# Patient Record
Sex: MALE | Race: BLACK OR AFRICAN AMERICAN | NOT HISPANIC OR LATINO | URBAN - METROPOLITAN AREA
[De-identification: names, ages, dates, MRNs, and addresses within clinical notes are randomized per-mention and may not be internally consistent; named-entity substitution may affect disease eponyms.]

---

## 2017-05-28 ENCOUNTER — EMERGENCY (EMERGENCY)
Facility: HOSPITAL | Age: 52
LOS: 1 days | Discharge: HOME | End: 2017-05-28
Attending: EMERGENCY MEDICINE | Admitting: EMERGENCY MEDICINE
Payer: COMMERCIAL

## 2017-05-28 VITALS
RESPIRATION RATE: 20 BRPM | DIASTOLIC BLOOD PRESSURE: 81 MMHG | HEART RATE: 82 BPM | TEMPERATURE: 98 F | HEIGHT: 74 IN | OXYGEN SATURATION: 97 % | SYSTOLIC BLOOD PRESSURE: 124 MMHG

## 2017-05-28 VITALS
TEMPERATURE: 98 F | SYSTOLIC BLOOD PRESSURE: 116 MMHG | HEART RATE: 74 BPM | DIASTOLIC BLOOD PRESSURE: 66 MMHG | RESPIRATION RATE: 18 BRPM | OXYGEN SATURATION: 99 %

## 2017-05-28 DIAGNOSIS — R06.02 SHORTNESS OF BREATH: ICD-10-CM

## 2017-05-28 LAB
APTT BLD: 34.1 SEC — SIGNIFICANT CHANGE UP (ref 27.5–37.4)
BASOPHILS # BLD AUTO: 0 K/UL — SIGNIFICANT CHANGE UP (ref 0–0.2)
BASOPHILS NFR BLD AUTO: 0.5 % — SIGNIFICANT CHANGE UP (ref 0–2)
CK MB BLD-MCNC: 0.6 % — SIGNIFICANT CHANGE UP (ref 0–3.5)
CK MB CFR SERPL CALC: 2 NG/ML — SIGNIFICANT CHANGE UP (ref 0–6.7)
CK SERPL-CCNC: 357 U/L — HIGH (ref 30–200)
EOSINOPHIL # BLD AUTO: 0.2 K/UL — SIGNIFICANT CHANGE UP (ref 0–0.5)
EOSINOPHIL NFR BLD AUTO: 2.6 % — SIGNIFICANT CHANGE UP (ref 0–6)
HCT VFR BLD CALC: 39.6 % — SIGNIFICANT CHANGE UP (ref 39–50)
HGB BLD-MCNC: 13.2 G/DL — SIGNIFICANT CHANGE UP (ref 13–17)
INR BLD: 1.21 RATIO — HIGH (ref 0.88–1.16)
LYMPHOCYTES # BLD AUTO: 2 K/UL — SIGNIFICANT CHANGE UP (ref 1–3.3)
LYMPHOCYTES # BLD AUTO: 30.4 % — SIGNIFICANT CHANGE UP (ref 13–44)
MCHC RBC-ENTMCNC: 31.4 PG — SIGNIFICANT CHANGE UP (ref 27–34)
MCHC RBC-ENTMCNC: 33.2 GM/DL — SIGNIFICANT CHANGE UP (ref 32–36)
MCV RBC AUTO: 94.3 FL — SIGNIFICANT CHANGE UP (ref 80–100)
MONOCYTES # BLD AUTO: 0.6 K/UL — SIGNIFICANT CHANGE UP (ref 0–0.9)
MONOCYTES NFR BLD AUTO: 8.5 % — SIGNIFICANT CHANGE UP (ref 2–14)
NEUTROPHILS # BLD AUTO: 3.9 K/UL — SIGNIFICANT CHANGE UP (ref 1.8–7.4)
NEUTROPHILS NFR BLD AUTO: 58 % — SIGNIFICANT CHANGE UP (ref 43–77)
PLATELET # BLD AUTO: 261 K/UL — SIGNIFICANT CHANGE UP (ref 150–400)
PROTHROM AB SERPL-ACNC: 13.2 SEC — HIGH (ref 9.8–12.7)
RAPID RVP RESULT: SIGNIFICANT CHANGE UP
RBC # BLD: 4.2 M/UL — SIGNIFICANT CHANGE UP (ref 4.2–5.8)
RBC # FLD: 12.4 % — SIGNIFICANT CHANGE UP (ref 10.3–14.5)
TROPONIN T SERPL-MCNC: <0.01 NG/ML — SIGNIFICANT CHANGE UP (ref 0–0.06)
WBC # BLD: 6.7 K/UL — SIGNIFICANT CHANGE UP (ref 3.8–10.5)
WBC # FLD AUTO: 6.7 K/UL — SIGNIFICANT CHANGE UP (ref 3.8–10.5)

## 2017-05-28 PROCEDURE — 82550 ASSAY OF CK (CPK): CPT

## 2017-05-28 PROCEDURE — 84484 ASSAY OF TROPONIN QUANT: CPT

## 2017-05-28 PROCEDURE — 85730 THROMBOPLASTIN TIME PARTIAL: CPT

## 2017-05-28 PROCEDURE — 71046 X-RAY EXAM CHEST 2 VIEWS: CPT

## 2017-05-28 PROCEDURE — 93010 ELECTROCARDIOGRAM REPORT: CPT

## 2017-05-28 PROCEDURE — 93005 ELECTROCARDIOGRAM TRACING: CPT

## 2017-05-28 PROCEDURE — 87581 M.PNEUMON DNA AMP PROBE: CPT

## 2017-05-28 PROCEDURE — 71020: CPT | Mod: 26

## 2017-05-28 PROCEDURE — 99285 EMERGENCY DEPT VISIT HI MDM: CPT | Mod: 25

## 2017-05-28 PROCEDURE — 87633 RESP VIRUS 12-25 TARGETS: CPT

## 2017-05-28 PROCEDURE — 82553 CREATINE MB FRACTION: CPT

## 2017-05-28 PROCEDURE — 85610 PROTHROMBIN TIME: CPT

## 2017-05-28 PROCEDURE — 83880 ASSAY OF NATRIURETIC PEPTIDE: CPT

## 2017-05-28 PROCEDURE — 99283 EMERGENCY DEPT VISIT LOW MDM: CPT | Mod: 25

## 2017-05-28 PROCEDURE — 87486 CHLMYD PNEUM DNA AMP PROBE: CPT

## 2017-05-28 PROCEDURE — 85027 COMPLETE CBC AUTOMATED: CPT

## 2017-05-28 PROCEDURE — 87798 DETECT AGENT NOS DNA AMP: CPT

## 2017-05-28 PROCEDURE — 80053 COMPREHEN METABOLIC PANEL: CPT

## 2017-05-28 RX ORDER — AMLODIPINE BESYLATE AND OLMESARTRAN MEDOXOMIL 10; 40 MG/1; MG/1
0 TABLET, FILM COATED ORAL
Qty: 0 | Refills: 0 | COMMUNITY

## 2017-05-28 NOTE — ED ADULT TRIAGE NOTE - CHIEF COMPLAINT QUOTE
difficulty breathing, "throat closing especially at night" (denies chest pain/nausea/vomiting/long distance travel, +nonsmoker), +dry cough x 2 wks

## 2017-05-28 NOTE — ED PROVIDER NOTE - OBJECTIVE STATEMENT
51 M htn and prediabetes p/w dyspnea x 2 days and dry cough x 2wks. per pt, has been having non-productive cough, nasal congestion. SOB at night and "feels like throat is closing up on him". Also, c/o decreased exercise tolerance. No fever, chills, n/v/d. No chest pain, palpitations. No recent travels. Per pt, has been retaining water recently and he was started on HCTZ.

## 2017-05-28 NOTE — ED ADULT NURSE NOTE - OBJECTIVE STATEMENT
52 yo M presents to ED A+Ox3 c/o difficulty breathing at night for the past two nights. Pt. reports "feeling like my throat is closing" at night. Reports improvement after sitting up. Pt. sitting in stretcher comfortably, breathing unlabored on RA, lung sounds clear. Skin warm pink and dry. Abdomen soft. Afebrile at this time. Pt. reports nasal congestion and dry cough x2 nights. Denies abdominal pain, diarrhea, changes in urinary pattern, chest pain, palpitations, fever, chills, cough, sore throat. 52 yo M presents to ED A+Ox3 c/o difficulty breathing at night for the past two nights. Pt. reports "feeling like my throat is closing" at night. Reports improvement after sitting up. Pt. sitting in stretcher comfortably, breathing unlabored on RA, lung sounds clear. Skin warm pink and dry. Abdomen soft. Afebrile at this time. Pt. reports nasal congestion and dry cough x2 nights. Denies abdominal pain, diarrhea, changes in urinary pattern, chest pain, palpitations, fever, chills, cough, sore throat. Pt. denies SOB at this time. Comfort and safety measures in place. MD at bedside evaluating patient.

## 2017-05-28 NOTE — ED PROVIDER NOTE - ATTENDING CONTRIBUTION TO CARE
Patient with shortness of breath and nasal congestion x 2 days. Moderate. Not better with rest. Non-productive cough. Patient states mild throat closing at night time and decreased exercise tolerance. No chest pain. No fever/chills. No leg swelling.    patient's BMI places him in the morbidly obese category, ncat, trachea midline, cooperative, shortened neck, ctab, non-tachycardic, non-tachypneic, pleasant, no edema, soft, no rebound/guarding  will get iv, labs, CE, and pro-BNP and reassess  pro-BNP within normal limits, CE negative, pending negative rvp and patient to  follow up with sleep study in 3 days.

## 2017-05-28 NOTE — ED PROVIDER NOTE - PROGRESS NOTE DETAILS
Faith Goss M.D: pt signed out to me pending rvp. RVP is negative, labs wnl. pt has follow up for wednesday to get sleep study. pt okay with going home. Yuri Tabares MD: No immediate life threatening issues present on history or clinical exam. Patient is a safe disposition home, has capacity and insight into their condition, ambulatory in the emergency department and will follow up with their doctor(s) this week. Patient and family  understand anticipatory guidance were given strict return and follow up precautions.  The patient and family have been informed of all concerning signs and symptoms to return to Emergency Department, the necessity to follow up with PMD/Clinic/follow up provided within 2-3 days was explained, and the patient and/or family reports understanding of above with capacity and insight.

## 2017-05-28 NOTE — ED ADULT NURSE REASSESSMENT NOTE - NS ED NURSE REASSESS COMMENT FT1
Labs sent as ordered. Breathing unlabored on RA. Pt. denies SOB, chest pain. Pt. appears comfortable. Comfort and safety measures in place.

## 2024-02-09 ENCOUNTER — EMERGENCY (EMERGENCY)
Facility: HOSPITAL | Age: 59
LOS: 1 days | Discharge: HOME | End: 2024-02-09
Attending: STUDENT IN AN ORGANIZED HEALTH CARE EDUCATION/TRAINING PROGRAM
Payer: COMMERCIAL

## 2024-02-09 VITALS
RESPIRATION RATE: 18 BRPM | HEIGHT: 74 IN | DIASTOLIC BLOOD PRESSURE: 109 MMHG | TEMPERATURE: 99 F | HEART RATE: 95 BPM | SYSTOLIC BLOOD PRESSURE: 200 MMHG | OXYGEN SATURATION: 98 %

## 2024-02-09 VITALS
OXYGEN SATURATION: 95 % | TEMPERATURE: 99 F | DIASTOLIC BLOOD PRESSURE: 101 MMHG | SYSTOLIC BLOOD PRESSURE: 166 MMHG | RESPIRATION RATE: 16 BRPM | HEART RATE: 76 BPM

## 2024-02-09 PROBLEM — R73.03 PREDIABETES: Chronic | Status: ACTIVE | Noted: 2017-05-28

## 2024-02-09 PROBLEM — G47.30 SLEEP APNEA, UNSPECIFIED: Chronic | Status: ACTIVE | Noted: 2017-05-28

## 2024-02-09 PROBLEM — I10 ESSENTIAL (PRIMARY) HYPERTENSION: Chronic | Status: ACTIVE | Noted: 2017-05-28

## 2024-02-09 LAB
ALBUMIN SERPL ELPH-MCNC: 4.2 G/DL — SIGNIFICANT CHANGE UP (ref 3.3–5)
ALP SERPL-CCNC: 67 U/L — SIGNIFICANT CHANGE UP (ref 40–120)
ALT FLD-CCNC: 17 U/L — SIGNIFICANT CHANGE UP (ref 10–45)
ANION GAP SERPL CALC-SCNC: 12 MMOL/L — SIGNIFICANT CHANGE UP (ref 5–17)
AST SERPL-CCNC: 17 U/L — SIGNIFICANT CHANGE UP (ref 10–40)
BASOPHILS # BLD AUTO: 0.04 K/UL — SIGNIFICANT CHANGE UP (ref 0–0.2)
BASOPHILS NFR BLD AUTO: 0.6 % — SIGNIFICANT CHANGE UP (ref 0–2)
BILIRUB SERPL-MCNC: 0.2 MG/DL — SIGNIFICANT CHANGE UP (ref 0.2–1.2)
BUN SERPL-MCNC: 10 MG/DL — SIGNIFICANT CHANGE UP (ref 7–23)
CALCIUM SERPL-MCNC: 9.5 MG/DL — SIGNIFICANT CHANGE UP (ref 8.4–10.5)
CHLORIDE SERPL-SCNC: 101 MMOL/L — SIGNIFICANT CHANGE UP (ref 96–108)
CO2 SERPL-SCNC: 30 MMOL/L — SIGNIFICANT CHANGE UP (ref 22–31)
CREAT SERPL-MCNC: 1.08 MG/DL — SIGNIFICANT CHANGE UP (ref 0.5–1.3)
EGFR: 79 ML/MIN/1.73M2 — SIGNIFICANT CHANGE UP
EOSINOPHIL # BLD AUTO: 0.24 K/UL — SIGNIFICANT CHANGE UP (ref 0–0.5)
EOSINOPHIL NFR BLD AUTO: 3.6 % — SIGNIFICANT CHANGE UP (ref 0–6)
GLUCOSE SERPL-MCNC: 83 MG/DL — SIGNIFICANT CHANGE UP (ref 70–99)
HCT VFR BLD CALC: 42.9 % — SIGNIFICANT CHANGE UP (ref 39–50)
HGB BLD-MCNC: 13.3 G/DL — SIGNIFICANT CHANGE UP (ref 13–17)
IMM GRANULOCYTES NFR BLD AUTO: 0.2 % — SIGNIFICANT CHANGE UP (ref 0–0.9)
LYMPHOCYTES # BLD AUTO: 1.81 K/UL — SIGNIFICANT CHANGE UP (ref 1–3.3)
LYMPHOCYTES # BLD AUTO: 27.4 % — SIGNIFICANT CHANGE UP (ref 13–44)
MAGNESIUM SERPL-MCNC: 2.1 MG/DL — SIGNIFICANT CHANGE UP (ref 1.6–2.6)
MCHC RBC-ENTMCNC: 28.5 PG — SIGNIFICANT CHANGE UP (ref 27–34)
MCHC RBC-ENTMCNC: 31 GM/DL — LOW (ref 32–36)
MCV RBC AUTO: 92.1 FL — SIGNIFICANT CHANGE UP (ref 80–100)
MONOCYTES # BLD AUTO: 0.65 K/UL — SIGNIFICANT CHANGE UP (ref 0–0.9)
MONOCYTES NFR BLD AUTO: 9.8 % — SIGNIFICANT CHANGE UP (ref 2–14)
NEUTROPHILS # BLD AUTO: 3.85 K/UL — SIGNIFICANT CHANGE UP (ref 1.8–7.4)
NEUTROPHILS NFR BLD AUTO: 58.4 % — SIGNIFICANT CHANGE UP (ref 43–77)
NRBC # BLD: 0 /100 WBCS — SIGNIFICANT CHANGE UP (ref 0–0)
NT-PROBNP SERPL-SCNC: 47 PG/ML — SIGNIFICANT CHANGE UP (ref 0–300)
PLATELET # BLD AUTO: 309 K/UL — SIGNIFICANT CHANGE UP (ref 150–400)
POTASSIUM SERPL-MCNC: 4.1 MMOL/L — SIGNIFICANT CHANGE UP (ref 3.5–5.3)
POTASSIUM SERPL-SCNC: 4.1 MMOL/L — SIGNIFICANT CHANGE UP (ref 3.5–5.3)
PROT SERPL-MCNC: 7.9 G/DL — SIGNIFICANT CHANGE UP (ref 6–8.3)
RBC # BLD: 4.66 M/UL — SIGNIFICANT CHANGE UP (ref 4.2–5.8)
RBC # FLD: 13.2 % — SIGNIFICANT CHANGE UP (ref 10.3–14.5)
SODIUM SERPL-SCNC: 143 MMOL/L — SIGNIFICANT CHANGE UP (ref 135–145)
TROPONIN T, HIGH SENSITIVITY RESULT: 7 NG/L — SIGNIFICANT CHANGE UP (ref 0–51)
WBC # BLD: 6.6 K/UL — SIGNIFICANT CHANGE UP (ref 3.8–10.5)
WBC # FLD AUTO: 6.6 K/UL — SIGNIFICANT CHANGE UP (ref 3.8–10.5)

## 2024-02-09 PROCEDURE — 83880 ASSAY OF NATRIURETIC PEPTIDE: CPT

## 2024-02-09 PROCEDURE — 93005 ELECTROCARDIOGRAM TRACING: CPT

## 2024-02-09 PROCEDURE — 83735 ASSAY OF MAGNESIUM: CPT

## 2024-02-09 PROCEDURE — 99285 EMERGENCY DEPT VISIT HI MDM: CPT

## 2024-02-09 PROCEDURE — 99285 EMERGENCY DEPT VISIT HI MDM: CPT | Mod: 25

## 2024-02-09 PROCEDURE — 85025 COMPLETE CBC W/AUTO DIFF WBC: CPT

## 2024-02-09 PROCEDURE — 80053 COMPREHEN METABOLIC PANEL: CPT

## 2024-02-09 PROCEDURE — 71045 X-RAY EXAM CHEST 1 VIEW: CPT

## 2024-02-09 PROCEDURE — 71045 X-RAY EXAM CHEST 1 VIEW: CPT | Mod: 26

## 2024-02-09 PROCEDURE — 84484 ASSAY OF TROPONIN QUANT: CPT

## 2024-02-09 NOTE — ED PROVIDER NOTE - ATTENDING CONTRIBUTION TO CARE
Attending Manish: I performed a history and physical exam of the patient and discussed their management with the resident/fellow/student. I have reviewed the resident/fellow/student note and agree with the documented findings and plan of care, except as noted. I have personally performed a substantive portion of the visit including all aspects of the medical decision making. My medical decision making and observations are found above. Please refer to any progress notes for updates on clinical course. My notes supersedes the above resident/fellow/student note in case of discrepancy

## 2024-02-09 NOTE — ED PROVIDER NOTE - PROGRESS NOTE DETAILS
Marysol, PGY3: Patient made aware of his work up results. Patient understands to follow up with cardiology for further management and work up for his shortness of breath. Patient found with stable VS for discharge. Will dc with strict return precautions, PCP & cardiology follow up.

## 2024-02-09 NOTE — ED PROVIDER NOTE - NSFOLLOWUPINSTRUCTIONS_ED_ALL_ED_FT
You were seen for shortness of breath. Please find your results attached to this document.     Continue all your home medications.     Please follow up with your primary care doctor for further management.     Please follow up with cardiology for further management such as echocardiogram.     Please return to the ED for any chest pain, syncope or any other concerns.

## 2024-02-09 NOTE — ED PROVIDER NOTE - OBJECTIVE STATEMENT
57 y/o M w/ PMH of HTN, SURAJ, ?CHF, DM presenting w/ edema. Seen w/ significant other. Reports for over a month has felt like he developed swelling in his legs and arms. Has been more short of breath during this time as well. worse w/ exertion such as prolonged walking or climbing stairs. Is supposed to be on diuretics but stopped approx 1 month ago because he felt like his back pain was kidney pain from the diuretic use. Has started taking it again for the past few days. Has not followed up w/ PCP or cardiologist for this yet. Denies fevers, chills, headache, dizziness, blurred vision, chest pain, cough, abdominal pain, n/v/d/c, urinary symptoms, MSK pain, rash.

## 2024-02-09 NOTE — ED PROVIDER NOTE - NSFOLLOWUPCLINICS_GEN_ALL_ED_FT
Cardiology at [facility]  Cardiology  [address] [place] [address]  Phone: [phone]  Fax:

## 2024-02-09 NOTE — ED ADULT NURSE NOTE - NSFALLUNIVINTERV_ED_ALL_ED
Bed/Stretcher in lowest position, wheels locked, appropriate side rails in place/Call bell, personal items and telephone in reach/Instruct patient to call for assistance before getting out of bed/chair/stretcher/Non-slip footwear applied when patient is off stretcher/[place] to call system/Physically safe environment - no spills, clutter or unnecessary equipment/Purposeful proactive rounding/Room/bathroom lighting operational, light cord in reach

## 2024-02-09 NOTE — ED PROVIDER NOTE - PATIENT PORTAL LINK FT
You can access the FollowMyHealth Patient Portal offered by [facility] by registering at the following website: [email or web address]. By joining [facility]’s FollowMyHealth portal, you will also be able to view your health information using other applications (apps) compatible with our system.

## 2024-02-09 NOTE — ED PROVIDER NOTE - RAPID ASSESSMENT
57 y/o with PMHX sleep apnea, HTN, "water retention", type II DM on no medications for 1 month because (hurts his kidneys), presents to the ED complaining of water retention all over the body but mainly in the legs. Feels that the swelling is all over the body. Testicles are not swollen. He is urinating normally. He stopped taking all of his medications 1 month ago because he did not like how they made his kidneys feel. He was previously on a "water pill". NKDA. No smoking/drinking daily. Never had surgery before. No chest pain. Feels short of breath when he walks up a flight of stairs or when he walks too far. Feels generalized weakness and fatigue. Has had these symptoms for over a month. Does not feel like himself. No abdominal pain, n/v.     Patient was rapidly assessed via a telemedicine and/or role of Quick Triage TAO [place]. A limited history, physical exam and assessment was performed. The patient will be seen and further evaluated in the main emergency department. The remainder of care and evaluation will be conducted by the primary emergency medicine team. Receiving team will follow up on labs, imaging and serially reassess patient as indicated. All further decisions regarding patient care, evaluation and disposition are at the discretion of the receiving primary emergency department team.

## 2024-02-09 NOTE — ED PROVIDER NOTE - CLINICAL SUMMARY MEDICAL DECISION MAKING FREE TEXT BOX
Attending Manish: 59 y/o M w/ PMH of HTN, SURAJ, ?CHF, DM presenting w/ edema. Seen w/ significant other. Reports for over a month has felt like he developed swelling in his legs and arms. Has been more short of breath during this time as well. worse w/ exertion such as prolonged walking or climbing stairs. Is supposed to be on diuretics but stopped approx 1 month ago because he felt like his back pain was kidney pain from the diuretic use. Has started taking it again for the past few days. Has not followed up w/ PCP or cardiologist for this yet. Denies fevers, chills, headache, dizziness, blurred vision, chest pain, cough, abdominal pain, n/v/d/c, urinary symptoms, MSK pain, rash. Pt overall no acute distress. PE as above. Will eval for CHF. Eval for kidney dysfunction. Do not suspect ACS. Do not suggest cellulitis or DVT. Plan for labs, imaging, ekg, meds PRN. Will reassess the need for additional interventions as clinically warranted. Refer to any progress notes for updates on clinical course and as a continuation of this MDM.

## 2024-02-09 NOTE — ED ADULT NURSE NOTE - OBJECTIVE STATEMENT
Pt with  with PMHX sleep apnea, HTN, "water retention", type II DM on no medications for 1 month because (hurts his kidneys), came to the ED complaining of generalized body edema. but mainly in the legs. Feels that the swelling is all over the body. Testicles are not swollen. He is urinating normally. No distress. Breathing easy and non labored. Pt is ambulatory. Pt is calm and cooperative.

## 2024-02-09 NOTE — ED PROVIDER NOTE - PHYSICAL EXAMINATION
Gen: NAD, AOx3, able to make needs known, non-toxic  Head: NCAT  HEENT: EOMI, oral mucosa moist, normal conjunctiva  Lung: no respiratory distress, CTAB, no wheezes/rhonchi/rales B/L, speaking in full sentences  CV: RRR, no murmurs  Abd: non distended, soft, nontender, no guarding, no CVA tenderness  MSK: no visible deformities. edematous BLE and BUE  Neuro: Appears non focal  Skin: Warm, well perfused  Psych: normal affect

## 2024-02-12 NOTE — EEG REPORT - NS EEG TEXT BOX
REPORT OF CONTINUOUS VIDEO EEG      [facility]: [address], [place] [address], Phone: [phone]  [facility]: [id number] [facility], [place] [address], Phone: [phone]  [facility]: [address] [place] [address], Phone: [phone]    Patient Name: Bucky Clarke    Age: 64 year, : 1959  MRN #: -, Cochran: --  Referring Physician: -  EEG #: [id number]    Study Date: 2024   Start Time: 13:35   End Date:  2024        End Time: 08:00    Study Duration: 18 h 3 m    Study Information:    EEG Recording Technique:  The patient underwent continuous Video-EEG monitoring, using Telemetry System hardware on the XLTek Digital System. EEG and video data were stored on a computer hard drive with important events saved in digital archive files. The material was reviewed by a physician (electroencephalographer / epileptologist) on a daily basis. Lei and seizure detection algorithms were utilized and reviewed. An EEG Technician attended to the patient, and was available throughout daytime work hours.  The epilepsy center neurologist was available in person or on call 24-hours per day.    EEG Placement and Labeling of Electrodes:  The EEG was performed utilizing 20 channel referential EEG connections (coronal over temporal over parasagittal montage) using all standard 10-20 electrode placements with EKG, with additional electrodes placed in the inferior temporal region using the modified 10-10 montage electrode placements for elective admissions, or if deemed necessary. Recording was at a sampling rate of 256 samples per second per channel. Time synchronized digital video recording was done simultaneously with EEG recording. A low light infrared camera was used for low light recording.     History:  -64 year-old male with memory loss is here to rule out  seizures      Medication  Flomax    Tylenol    Lovenox      Interpretation:    [[[Abbreviation Key:  PDR=alpha rhythm/posterior dominant rhythm. A-P=anterior posterior.  Amplitude: ‘very low’:<20; ‘low’:20-49; ‘medium’:; ‘high’:>150uV.  Persistence for periodic/rhythmic patterns (% of epoch) ‘rare’:<1%; ‘occasional’:1-10%; ‘frequent’:10-50%; ‘abundant’:50-90%; ‘continuous’:>90%.  Persistence for sporadic discharges: ‘rare’:<1/hr; ‘occasional’:1/min-1/hr; ‘frequent’:>1/min; ‘abundant’:>1/10 sec.  RPP=rhythmic and periodic patterns; GRDA=generalized rhythmic delta activity; FIRDA=frontal intermittent GRDA; LRDA=lateralized rhythmic delta activity; TIRDA=temporal intermittent rhythmic delta activity;  LPD=PLED=lateralized periodic discharges; GPD=generalized periodic discharges; BIPDs =bilateral independent periodic discharges; Mf=multifocal; SIRPDs=stimulus induced rhythmic, periodic, or ictal appearing discharges; BIRDs=brief potentially ictal rhythmic discharges >4 Hz, lasting .5-10s; PFA (paroxysmal bursts >13 Hz or =8 Hz <10s).  Modifiers: +F=with fast component; +S=with spike component; +R=with rhythmic component.  S-B=burst suppression pattern.  Max=maximal. N1-drowsy; N2-stage II sleep; N3-slow wave sleep. SSS/BETS=small sharp spikes/benign epileptiform transients of sleep. HV=hyperventilation; PS=photic stimulation]]]      Daily EEG Visual Analysis    FINDINGS:      Background:  Symmetry: symmetric  Continuous: continuous  PDR: symmetric, well-modulated 9 Hz activity, with amplitude to 40 uV, that attenuated to eye opening.  Low amplitude frontal beta noted in wakefulness.  Reactivity: present  Voltage: normal, [defined typically between 20-150uV]  Anterior Posterior Gradient: present  Breach: absent    Background Slowing:  Generalized slowing: none was present.  Focal slowing: none was present.    State Changes:   -Drowsiness was characterized by fragmentation, attenuation, and slowing of the background activity.    -N2 sleep transients with symmetric spindles and K-complexes.    Sporadic Epileptiform Discharges:   None    Rhythmic and Periodic Patterns (RPPs):  None     Electrographic and Electroclinical seizures:  None    Other Clinical Events:  None    Activation Procedures:   -Hyperventilation was not performed.    -Photic stimulation was not performed.    Artifacts:  Intermittent myogenic and movement artifacts were noted.    ECG:  The heart rate on single channel ECG was predominantly between 60-70 BPM.    EEG Summary / Classification:  Normal  EEG in the awake / drowsy / asleep states.  •	    EEG Impression / Clinical Correlate:  Normal prolonged EEG study.  No epileptic discharges recorded.  No seizures recorded.  •	    ________________________________________    MARYJO Zamora  Attending Physician, [facility] [place]      ------------------------------------  EEG Reading Room: [phone]  On Call Service After Hours: [phone]

## 2024-07-19 ENCOUNTER — INPATIENT (INPATIENT)
Facility: HOSPITAL | Age: 59
LOS: 7 days | Discharge: HOME | DRG: 948 | End: 2024-07-27
Attending: STUDENT IN AN ORGANIZED HEALTH CARE EDUCATION/TRAINING PROGRAM | Admitting: STUDENT IN AN ORGANIZED HEALTH CARE EDUCATION/TRAINING PROGRAM
Payer: COMMERCIAL

## 2024-07-19 VITALS
HEART RATE: 92 BPM | WEIGHT: 315 LBS | SYSTOLIC BLOOD PRESSURE: 203 MMHG | OXYGEN SATURATION: 94 % | TEMPERATURE: 98 F | HEIGHT: 74 IN | DIASTOLIC BLOOD PRESSURE: 103 MMHG | RESPIRATION RATE: 24 BRPM

## 2024-07-19 DIAGNOSIS — R60.1 GENERALIZED EDEMA: ICD-10-CM

## 2024-07-19 DIAGNOSIS — I10 ESSENTIAL (PRIMARY) HYPERTENSION: ICD-10-CM

## 2024-07-19 DIAGNOSIS — E11.9 TYPE 2 DIABETES MELLITUS WITHOUT COMPLICATIONS: ICD-10-CM

## 2024-07-19 DIAGNOSIS — Z29.9 ENCOUNTER FOR PROPHYLACTIC MEASURES, UNSPECIFIED: ICD-10-CM

## 2024-07-19 LAB
ALBUMIN SERPL ELPH-MCNC: 3.8 G/DL — SIGNIFICANT CHANGE UP (ref 3.3–5)
ALP SERPL-CCNC: 60 U/L — SIGNIFICANT CHANGE UP (ref 40–120)
ALT FLD-CCNC: 19 U/L — SIGNIFICANT CHANGE UP (ref 10–45)
ANION GAP SERPL CALC-SCNC: 12 MMOL/L — SIGNIFICANT CHANGE UP (ref 5–17)
APPEARANCE UR: CLEAR — SIGNIFICANT CHANGE UP
AST SERPL-CCNC: 14 U/L — SIGNIFICANT CHANGE UP (ref 10–40)
BASOPHILS # BLD AUTO: 0.04 K/UL — SIGNIFICANT CHANGE UP (ref 0–0.2)
BASOPHILS NFR BLD AUTO: 0.5 % — SIGNIFICANT CHANGE UP (ref 0–2)
BILIRUB SERPL-MCNC: 0.4 MG/DL — SIGNIFICANT CHANGE UP (ref 0.2–1.2)
BILIRUB UR-MCNC: NEGATIVE — SIGNIFICANT CHANGE UP
BUN SERPL-MCNC: 13 MG/DL — SIGNIFICANT CHANGE UP (ref 7–23)
CALCIUM SERPL-MCNC: 9.2 MG/DL — SIGNIFICANT CHANGE UP (ref 8.4–10.5)
CHLORIDE SERPL-SCNC: 101 MMOL/L — SIGNIFICANT CHANGE UP (ref 96–108)
CO2 SERPL-SCNC: 30 MMOL/L — SIGNIFICANT CHANGE UP (ref 22–31)
COLOR SPEC: YELLOW — SIGNIFICANT CHANGE UP
CREAT SERPL-MCNC: 0.98 MG/DL — SIGNIFICANT CHANGE UP (ref 0.5–1.3)
DIFF PNL FLD: NEGATIVE — SIGNIFICANT CHANGE UP
EGFR: 89 ML/MIN/1.73M2 — SIGNIFICANT CHANGE UP
EGFR: 89 ML/MIN/1.73M2 — SIGNIFICANT CHANGE UP
EOSINOPHIL # BLD AUTO: 0.18 K/UL — SIGNIFICANT CHANGE UP (ref 0–0.5)
EOSINOPHIL NFR BLD AUTO: 2.4 % — SIGNIFICANT CHANGE UP (ref 0–6)
GLUCOSE BLDC GLUCOMTR-MCNC: 109 MG/DL — HIGH (ref 70–99)
GLUCOSE SERPL-MCNC: 93 MG/DL — SIGNIFICANT CHANGE UP (ref 70–99)
GLUCOSE UR QL: NEGATIVE MG/DL — SIGNIFICANT CHANGE UP
HCT VFR BLD CALC: 38.8 % — LOW (ref 39–50)
HGB BLD-MCNC: 11.2 G/DL — LOW (ref 13–17)
IMM GRANULOCYTES NFR BLD AUTO: 0.3 % — SIGNIFICANT CHANGE UP (ref 0–0.9)
KETONES UR-MCNC: NEGATIVE MG/DL — SIGNIFICANT CHANGE UP
LEUKOCYTE ESTERASE UR-ACNC: NEGATIVE — SIGNIFICANT CHANGE UP
LIDOCAIN IGE QN: 17 U/L — SIGNIFICANT CHANGE UP (ref 7–60)
LYMPHOCYTES # BLD AUTO: 1.14 K/UL — SIGNIFICANT CHANGE UP (ref 1–3.3)
LYMPHOCYTES # BLD AUTO: 15.2 % — SIGNIFICANT CHANGE UP (ref 13–44)
MCHC RBC-ENTMCNC: 25.7 PG — LOW (ref 27–34)
MCHC RBC-ENTMCNC: 28.9 GM/DL — LOW (ref 32–36)
MCV RBC AUTO: 89.2 FL — SIGNIFICANT CHANGE UP (ref 80–100)
MONOCYTES # BLD AUTO: 0.75 K/UL — SIGNIFICANT CHANGE UP (ref 0–0.9)
MONOCYTES NFR BLD AUTO: 10 % — SIGNIFICANT CHANGE UP (ref 2–14)
NEUTROPHILS # BLD AUTO: 5.37 K/UL — SIGNIFICANT CHANGE UP (ref 1.8–7.4)
NEUTROPHILS NFR BLD AUTO: 71.6 % — SIGNIFICANT CHANGE UP (ref 43–77)
NITRITE UR-MCNC: NEGATIVE — SIGNIFICANT CHANGE UP
NRBC # BLD: 0 /100 WBCS — SIGNIFICANT CHANGE UP (ref 0–0)
NRBC BLD-RTO: 0 /100 WBCS — SIGNIFICANT CHANGE UP (ref 0–0)
NT-PROBNP SERPL-SCNC: 125 PG/ML — SIGNIFICANT CHANGE UP (ref 0–300)
PH UR: 7.5 — SIGNIFICANT CHANGE UP (ref 5–8)
PLATELET # BLD AUTO: 308 K/UL — SIGNIFICANT CHANGE UP (ref 150–400)
POTASSIUM SERPL-MCNC: 4.2 MMOL/L — SIGNIFICANT CHANGE UP (ref 3.5–5.3)
POTASSIUM SERPL-SCNC: 4.2 MMOL/L — SIGNIFICANT CHANGE UP (ref 3.5–5.3)
PROT SERPL-MCNC: 7.6 G/DL — SIGNIFICANT CHANGE UP (ref 6–8.3)
PROT UR-MCNC: NEGATIVE MG/DL — SIGNIFICANT CHANGE UP
RBC # BLD: 4.35 M/UL — SIGNIFICANT CHANGE UP (ref 4.2–5.8)
RBC # FLD: 14.6 % — HIGH (ref 10.3–14.5)
SODIUM SERPL-SCNC: 143 MMOL/L — SIGNIFICANT CHANGE UP (ref 135–145)
SP GR SPEC: 1.02 — SIGNIFICANT CHANGE UP (ref 1–1.03)
T3 SERPL-MCNC: 100 NG/DL — SIGNIFICANT CHANGE UP (ref 80–200)
TROPONIN T, HIGH SENSITIVITY RESULT: <6 NG/L — SIGNIFICANT CHANGE UP (ref 0–51)
TSH SERPL-MCNC: 1.6 UIU/ML — SIGNIFICANT CHANGE UP (ref 0.27–4.2)
UROBILINOGEN FLD QL: 1 MG/DL — SIGNIFICANT CHANGE UP (ref 0.2–1)
WBC # BLD: 7.5 K/UL — SIGNIFICANT CHANGE UP (ref 3.8–10.5)
WBC # FLD AUTO: 7.5 K/UL — SIGNIFICANT CHANGE UP (ref 3.8–10.5)

## 2024-07-19 PROCEDURE — 99285 EMERGENCY DEPT VISIT HI MDM: CPT

## 2024-07-19 PROCEDURE — 71045 X-RAY EXAM CHEST 1 VIEW: CPT | Mod: 26

## 2024-07-19 PROCEDURE — 71260 CT THORAX DX C+: CPT | Mod: 26,MC

## 2024-07-19 PROCEDURE — 99223 1ST HOSP IP/OBS HIGH 75: CPT

## 2024-07-19 RX ORDER — SODIUM CHLORIDE 9 G/1000ML
1000 INJECTION, SOLUTION INTRAVENOUS
Refills: 0 | Status: DISCONTINUED | OUTPATIENT
Start: 2024-07-19 | End: 2024-07-27

## 2024-07-19 RX ORDER — DEXTROSE 50 % IN WATER 50 %
15 SYRINGE (ML) INTRAVENOUS ONCE
Refills: 0 | Status: DISCONTINUED | OUTPATIENT
Start: 2024-07-19 | End: 2024-07-27

## 2024-07-19 RX ORDER — LOSARTAN POTASSIUM 100 MG/1
100 TABLET, FILM COATED ORAL DAILY
Refills: 0 | Status: DISCONTINUED | OUTPATIENT
Start: 2024-07-19 | End: 2024-07-19

## 2024-07-19 RX ORDER — METFORMIN HYDROCHLORIDE 850 MG/1
1 TABLET ORAL
Refills: 0 | DISCHARGE

## 2024-07-19 RX ORDER — MAGNESIUM, ALUMINUM HYDROXIDE 200-200 MG
30 TABLET,CHEWABLE ORAL EVERY 4 HOURS
Refills: 0 | Status: DISCONTINUED | OUTPATIENT
Start: 2024-07-19 | End: 2024-07-27

## 2024-07-19 RX ORDER — GLUCAGON 3 MG/1
1 POWDER NASAL ONCE
Refills: 0 | Status: DISCONTINUED | OUTPATIENT
Start: 2024-07-19 | End: 2024-07-27

## 2024-07-19 RX ORDER — OLMESARTAN MEDOXOMIL AND HYDROCHLOROTHIAZIDE 20; 12.5 MG/1; MG/1
1 TABLET ORAL
Refills: 0 | DISCHARGE

## 2024-07-19 RX ORDER — LOSARTAN POTASSIUM 100 MG/1
100 TABLET, FILM COATED ORAL AT BEDTIME
Refills: 0 | Status: DISCONTINUED | OUTPATIENT
Start: 2024-07-19 | End: 2024-07-27

## 2024-07-19 RX ORDER — INSULIN LISPRO 100 U/ML
INJECTION, SOLUTION INTRAVENOUS; SUBCUTANEOUS
Refills: 0 | Status: DISCONTINUED | OUTPATIENT
Start: 2024-07-19 | End: 2024-07-27

## 2024-07-19 RX ORDER — ACETAMINOPHEN 500 MG/5ML
650 LIQUID (ML) ORAL EVERY 6 HOURS
Refills: 0 | Status: DISCONTINUED | OUTPATIENT
Start: 2024-07-19 | End: 2024-07-27

## 2024-07-19 RX ORDER — MELATONIN 5 MG
3 TABLET ORAL AT BEDTIME
Refills: 0 | Status: DISCONTINUED | OUTPATIENT
Start: 2024-07-19 | End: 2024-07-27

## 2024-07-19 RX ORDER — TIRZEPATIDE 7.5 MG/.5ML
0 INJECTION, SOLUTION SUBCUTANEOUS
Refills: 0 | DISCHARGE

## 2024-07-19 RX ORDER — DEXTROSE 50 % IN WATER 50 %
25 SYRINGE (ML) INTRAVENOUS ONCE
Refills: 0 | Status: DISCONTINUED | OUTPATIENT
Start: 2024-07-19 | End: 2024-07-27

## 2024-07-19 RX ORDER — DEXTROSE 50 % IN WATER 50 %
12.5 SYRINGE (ML) INTRAVENOUS ONCE
Refills: 0 | Status: DISCONTINUED | OUTPATIENT
Start: 2024-07-19 | End: 2024-07-27

## 2024-07-19 RX ORDER — CARVEDILOL 3.12 MG/1
12.5 TABLET, FILM COATED ORAL EVERY 12 HOURS
Refills: 0 | Status: DISCONTINUED | OUTPATIENT
Start: 2024-07-19 | End: 2024-07-24

## 2024-07-19 RX ORDER — FUROSEMIDE 10 MG/ML
20 INJECTION INTRAMUSCULAR; INTRAVENOUS DAILY
Refills: 0 | Status: DISCONTINUED | OUTPATIENT
Start: 2024-07-19 | End: 2024-07-20

## 2024-07-19 RX ORDER — ENOXAPARIN SODIUM 100 MG/ML
40 INJECTION SUBCUTANEOUS EVERY 12 HOURS
Refills: 0 | Status: DISCONTINUED | OUTPATIENT
Start: 2024-07-19 | End: 2024-07-27

## 2024-07-19 RX ORDER — ONDANSETRON HCL/PF 4 MG/2 ML
4 VIAL (ML) INJECTION EVERY 8 HOURS
Refills: 0 | Status: DISCONTINUED | OUTPATIENT
Start: 2024-07-19 | End: 2024-07-27

## 2024-07-19 RX ORDER — INSULIN LISPRO 100 U/ML
INJECTION, SOLUTION INTRAVENOUS; SUBCUTANEOUS AT BEDTIME
Refills: 0 | Status: DISCONTINUED | OUTPATIENT
Start: 2024-07-19 | End: 2024-07-27

## 2024-07-19 RX ADMIN — CARVEDILOL 12.5 MILLIGRAM(S): 3.12 TABLET, FILM COATED ORAL at 23:30

## 2024-07-19 RX ADMIN — LOSARTAN POTASSIUM 100 MILLIGRAM(S): 100 TABLET, FILM COATED ORAL at 23:47

## 2024-07-20 LAB
GLUCOSE BLDC GLUCOMTR-MCNC: 108 MG/DL — HIGH (ref 70–99)
GLUCOSE BLDC GLUCOMTR-MCNC: 114 MG/DL — HIGH (ref 70–99)
GLUCOSE BLDC GLUCOMTR-MCNC: 120 MG/DL — HIGH (ref 70–99)
GLUCOSE BLDC GLUCOMTR-MCNC: 165 MG/DL — HIGH (ref 70–99)

## 2024-07-20 PROCEDURE — 93970 EXTREMITY STUDY: CPT | Mod: 26

## 2024-07-20 PROCEDURE — 93971 EXTREMITY STUDY: CPT | Mod: 26,RT

## 2024-07-20 RX ORDER — FUROSEMIDE 10 MG/ML
40 INJECTION INTRAMUSCULAR; INTRAVENOUS DAILY
Refills: 0 | Status: COMPLETED | OUTPATIENT
Start: 2024-07-20 | End: 2024-07-20

## 2024-07-20 RX ORDER — FUROSEMIDE 10 MG/ML
40 INJECTION INTRAMUSCULAR; INTRAVENOUS DAILY
Refills: 0 | Status: DISCONTINUED | OUTPATIENT
Start: 2024-07-20 | End: 2024-07-21

## 2024-07-20 RX ADMIN — CARVEDILOL 12.5 MILLIGRAM(S): 3.12 TABLET, FILM COATED ORAL at 05:24

## 2024-07-20 RX ADMIN — INSULIN LISPRO 1: 100 INJECTION, SOLUTION INTRAVENOUS; SUBCUTANEOUS at 17:12

## 2024-07-20 RX ADMIN — CARVEDILOL 12.5 MILLIGRAM(S): 3.12 TABLET, FILM COATED ORAL at 17:12

## 2024-07-20 RX ADMIN — ENOXAPARIN SODIUM 40 MILLIGRAM(S): 100 INJECTION SUBCUTANEOUS at 17:12

## 2024-07-20 RX ADMIN — FUROSEMIDE 40 MILLIGRAM(S): 10 INJECTION INTRAMUSCULAR; INTRAVENOUS at 11:31

## 2024-07-20 RX ADMIN — LOSARTAN POTASSIUM 100 MILLIGRAM(S): 100 TABLET, FILM COATED ORAL at 22:47

## 2024-07-20 RX ADMIN — FUROSEMIDE 20 MILLIGRAM(S): 10 INJECTION INTRAMUSCULAR; INTRAVENOUS at 05:24

## 2024-07-20 RX ADMIN — ENOXAPARIN SODIUM 40 MILLIGRAM(S): 100 INJECTION SUBCUTANEOUS at 05:24

## 2024-07-21 LAB
A1C WITH ESTIMATED AVERAGE GLUCOSE RESULT: 6.7 % — HIGH (ref 4–5.6)
ANION GAP SERPL CALC-SCNC: 13 MMOL/L — SIGNIFICANT CHANGE UP (ref 5–17)
BASE EXCESS BLDV CALC-SCNC: 6.5 MMOL/L — HIGH (ref -2–3)
BUN SERPL-MCNC: 11 MG/DL — SIGNIFICANT CHANGE UP (ref 7–23)
CA-I SERPL-SCNC: 1.18 MMOL/L — SIGNIFICANT CHANGE UP (ref 1.15–1.33)
CALCIUM SERPL-MCNC: 9.1 MG/DL — SIGNIFICANT CHANGE UP (ref 8.4–10.5)
CHLORIDE BLDV-SCNC: 100 MMOL/L — SIGNIFICANT CHANGE UP (ref 96–108)
CHLORIDE SERPL-SCNC: 99 MMOL/L — SIGNIFICANT CHANGE UP (ref 96–108)
CO2 BLDV-SCNC: 37 MMOL/L — HIGH (ref 22–26)
CO2 SERPL-SCNC: 29 MMOL/L — SIGNIFICANT CHANGE UP (ref 22–31)
CORTIS AM PEAK SERPL-MCNC: 14 UG/DL — SIGNIFICANT CHANGE UP (ref 6–18.4)
CREAT SERPL-MCNC: 0.81 MG/DL — SIGNIFICANT CHANGE UP (ref 0.5–1.3)
CRP SERPL-MCNC: 23 MG/L — HIGH (ref 0–4)
EGFR: 102 ML/MIN/1.73M2 — SIGNIFICANT CHANGE UP
EGFR: 102 ML/MIN/1.73M2 — SIGNIFICANT CHANGE UP
ERYTHROCYTE [SEDIMENTATION RATE] IN BLOOD: 87 MM/HR — HIGH (ref 0–20)
ESTIMATED AVERAGE GLUCOSE: 146 MG/DL — HIGH (ref 68–114)
GAS PNL BLDV: 137 MMOL/L — SIGNIFICANT CHANGE UP (ref 136–145)
GAS PNL BLDV: SIGNIFICANT CHANGE UP
GAS PNL BLDV: SIGNIFICANT CHANGE UP
GLUCOSE BLDC GLUCOMTR-MCNC: 107 MG/DL — HIGH (ref 70–99)
GLUCOSE BLDC GLUCOMTR-MCNC: 111 MG/DL — HIGH (ref 70–99)
GLUCOSE BLDC GLUCOMTR-MCNC: 95 MG/DL — SIGNIFICANT CHANGE UP (ref 70–99)
GLUCOSE BLDC GLUCOMTR-MCNC: 96 MG/DL — SIGNIFICANT CHANGE UP (ref 70–99)
GLUCOSE BLDV-MCNC: 101 MG/DL — HIGH (ref 70–99)
GLUCOSE SERPL-MCNC: 100 MG/DL — HIGH (ref 70–99)
HCO3 BLDV-SCNC: 34 MMOL/L — HIGH (ref 22–29)
HCT VFR BLD CALC: 38.5 % — LOW (ref 39–50)
HCT VFR BLDA CALC: 34 % — LOW (ref 39–51)
HGB BLD CALC-MCNC: 11.4 G/DL — LOW (ref 12.6–17.4)
HGB BLD-MCNC: 11.4 G/DL — LOW (ref 13–17)
LACTATE BLDV-MCNC: 1.7 MMOL/L — SIGNIFICANT CHANGE UP (ref 0.5–2)
MAGNESIUM SERPL-MCNC: 2.1 MG/DL — SIGNIFICANT CHANGE UP (ref 1.6–2.6)
MCHC RBC-ENTMCNC: 26.8 PG — LOW (ref 27–34)
MCHC RBC-ENTMCNC: 29.6 GM/DL — LOW (ref 32–36)
MCV RBC AUTO: 90.6 FL — SIGNIFICANT CHANGE UP (ref 80–100)
NRBC # BLD: 0 /100 WBCS — SIGNIFICANT CHANGE UP (ref 0–0)
NRBC BLD-RTO: 0 /100 WBCS — SIGNIFICANT CHANGE UP (ref 0–0)
PCO2 BLDV: 67 MMHG — HIGH (ref 42–55)
PH BLDV: 7.32 — SIGNIFICANT CHANGE UP (ref 7.32–7.43)
PHOSPHATE SERPL-MCNC: 3.8 MG/DL — SIGNIFICANT CHANGE UP (ref 2.5–4.5)
PLATELET # BLD AUTO: 326 K/UL — SIGNIFICANT CHANGE UP (ref 150–400)
PO2 BLDV: 37 MMHG — SIGNIFICANT CHANGE UP (ref 25–45)
POTASSIUM BLDV-SCNC: 3.4 MMOL/L — LOW (ref 3.5–5.1)
POTASSIUM SERPL-MCNC: 3.5 MMOL/L — SIGNIFICANT CHANGE UP (ref 3.5–5.3)
POTASSIUM SERPL-SCNC: 3.5 MMOL/L — SIGNIFICANT CHANGE UP (ref 3.5–5.3)
RBC # BLD: 4.25 M/UL — SIGNIFICANT CHANGE UP (ref 4.2–5.8)
RBC # FLD: 14.7 % — HIGH (ref 10.3–14.5)
SAO2 % BLDV: 58.1 % — LOW (ref 67–88)
SODIUM SERPL-SCNC: 141 MMOL/L — SIGNIFICANT CHANGE UP (ref 135–145)
WBC # BLD: 6.7 K/UL — SIGNIFICANT CHANGE UP (ref 3.8–10.5)
WBC # FLD AUTO: 6.7 K/UL — SIGNIFICANT CHANGE UP (ref 3.8–10.5)

## 2024-07-21 RX ORDER — FUROSEMIDE 10 MG/ML
40 INJECTION INTRAMUSCULAR; INTRAVENOUS
Refills: 0 | Status: DISCONTINUED | OUTPATIENT
Start: 2024-07-21 | End: 2024-07-26

## 2024-07-21 RX ADMIN — ENOXAPARIN SODIUM 40 MILLIGRAM(S): 100 INJECTION SUBCUTANEOUS at 17:42

## 2024-07-21 RX ADMIN — CARVEDILOL 12.5 MILLIGRAM(S): 3.12 TABLET, FILM COATED ORAL at 17:42

## 2024-07-21 RX ADMIN — CARVEDILOL 12.5 MILLIGRAM(S): 3.12 TABLET, FILM COATED ORAL at 06:06

## 2024-07-21 RX ADMIN — ENOXAPARIN SODIUM 40 MILLIGRAM(S): 100 INJECTION SUBCUTANEOUS at 06:06

## 2024-07-21 RX ADMIN — Medication 1 APPLICATION(S): at 15:06

## 2024-07-21 RX ADMIN — FUROSEMIDE 40 MILLIGRAM(S): 10 INJECTION INTRAMUSCULAR; INTRAVENOUS at 06:33

## 2024-07-21 RX ADMIN — LOSARTAN POTASSIUM 100 MILLIGRAM(S): 100 TABLET, FILM COATED ORAL at 21:47

## 2024-07-22 LAB
GLUCOSE BLDC GLUCOMTR-MCNC: 103 MG/DL — HIGH (ref 70–99)
GLUCOSE BLDC GLUCOMTR-MCNC: 111 MG/DL — HIGH (ref 70–99)
GLUCOSE BLDC GLUCOMTR-MCNC: 136 MG/DL — HIGH (ref 70–99)
GLUCOSE BLDC GLUCOMTR-MCNC: 98 MG/DL — SIGNIFICANT CHANGE UP (ref 70–99)

## 2024-07-22 PROCEDURE — 93306 TTE W/DOPPLER COMPLETE: CPT | Mod: 26

## 2024-07-22 RX ORDER — AMMONIUM LACTATE 12 %
1 LOTION (ML) TOPICAL EVERY 12 HOURS
Refills: 0 | Status: DISCONTINUED | OUTPATIENT
Start: 2024-07-22 | End: 2024-07-27

## 2024-07-22 RX ADMIN — CARVEDILOL 12.5 MILLIGRAM(S): 3.12 TABLET, FILM COATED ORAL at 18:16

## 2024-07-22 RX ADMIN — LOSARTAN POTASSIUM 100 MILLIGRAM(S): 100 TABLET, FILM COATED ORAL at 21:31

## 2024-07-22 RX ADMIN — ENOXAPARIN SODIUM 40 MILLIGRAM(S): 100 INJECTION SUBCUTANEOUS at 06:17

## 2024-07-22 RX ADMIN — Medication 1 APPLICATION(S): at 08:25

## 2024-07-22 RX ADMIN — ENOXAPARIN SODIUM 40 MILLIGRAM(S): 100 INJECTION SUBCUTANEOUS at 18:16

## 2024-07-22 RX ADMIN — FUROSEMIDE 40 MILLIGRAM(S): 10 INJECTION INTRAMUSCULAR; INTRAVENOUS at 13:37

## 2024-07-22 RX ADMIN — FUROSEMIDE 40 MILLIGRAM(S): 10 INJECTION INTRAMUSCULAR; INTRAVENOUS at 06:18

## 2024-07-22 RX ADMIN — CARVEDILOL 12.5 MILLIGRAM(S): 3.12 TABLET, FILM COATED ORAL at 06:18

## 2024-07-23 LAB
ADD ON TEST-SPECIMEN IN LAB: SIGNIFICANT CHANGE UP
ALBUMIN SERPL ELPH-MCNC: 3.7 G/DL — SIGNIFICANT CHANGE UP (ref 3.3–5)
ALP SERPL-CCNC: 56 U/L — SIGNIFICANT CHANGE UP (ref 40–120)
ALT FLD-CCNC: 18 U/L — SIGNIFICANT CHANGE UP (ref 10–45)
ANION GAP SERPL CALC-SCNC: 11 MMOL/L — SIGNIFICANT CHANGE UP (ref 5–17)
AST SERPL-CCNC: 20 U/L — SIGNIFICANT CHANGE UP (ref 10–40)
BILIRUB SERPL-MCNC: 0.4 MG/DL — SIGNIFICANT CHANGE UP (ref 0.2–1.2)
BUN SERPL-MCNC: 13 MG/DL — SIGNIFICANT CHANGE UP (ref 7–23)
CALCIUM SERPL-MCNC: 9.2 MG/DL — SIGNIFICANT CHANGE UP (ref 8.4–10.5)
CHLORIDE SERPL-SCNC: 95 MMOL/L — LOW (ref 96–108)
CK MB BLD-MCNC: 0.7 % — SIGNIFICANT CHANGE UP (ref 0–3.5)
CK MB CFR SERPL CALC: 2.7 NG/ML — SIGNIFICANT CHANGE UP (ref 0–6.7)
CK SERPL-CCNC: 389 U/L — HIGH (ref 30–200)
CO2 SERPL-SCNC: 35 MMOL/L — HIGH (ref 22–31)
CREAT SERPL-MCNC: 1.06 MG/DL — SIGNIFICANT CHANGE UP (ref 0.5–1.3)
EGFR: 81 ML/MIN/1.73M2 — SIGNIFICANT CHANGE UP
EGFR: 81 ML/MIN/1.73M2 — SIGNIFICANT CHANGE UP
GLUCOSE BLDC GLUCOMTR-MCNC: 112 MG/DL — HIGH (ref 70–99)
GLUCOSE BLDC GLUCOMTR-MCNC: 128 MG/DL — HIGH (ref 70–99)
GLUCOSE BLDC GLUCOMTR-MCNC: 133 MG/DL — HIGH (ref 70–99)
GLUCOSE BLDC GLUCOMTR-MCNC: 94 MG/DL — SIGNIFICANT CHANGE UP (ref 70–99)
GLUCOSE BLDC GLUCOMTR-MCNC: 99 MG/DL — SIGNIFICANT CHANGE UP (ref 70–99)
GLUCOSE SERPL-MCNC: 121 MG/DL — HIGH (ref 70–99)
HCT VFR BLD CALC: 38.7 % — LOW (ref 39–50)
HGB BLD-MCNC: 11.9 G/DL — LOW (ref 13–17)
MCHC RBC-ENTMCNC: 27.9 PG — SIGNIFICANT CHANGE UP (ref 27–34)
MCHC RBC-ENTMCNC: 30.7 GM/DL — LOW (ref 32–36)
MCV RBC AUTO: 90.8 FL — SIGNIFICANT CHANGE UP (ref 80–100)
NRBC # BLD: 0 /100 WBCS — SIGNIFICANT CHANGE UP (ref 0–0)
NRBC BLD-RTO: 0 /100 WBCS — SIGNIFICANT CHANGE UP (ref 0–0)
PLATELET # BLD AUTO: 304 K/UL — SIGNIFICANT CHANGE UP (ref 150–400)
POTASSIUM SERPL-MCNC: 3.7 MMOL/L — SIGNIFICANT CHANGE UP (ref 3.5–5.3)
POTASSIUM SERPL-SCNC: 3.7 MMOL/L — SIGNIFICANT CHANGE UP (ref 3.5–5.3)
PROLACTIN SERPL-MCNC: 146 NG/ML — HIGH (ref 4.1–18.4)
PROT SERPL-MCNC: 7.4 G/DL — SIGNIFICANT CHANGE UP (ref 6–8.3)
RBC # BLD: 4.26 M/UL — SIGNIFICANT CHANGE UP (ref 4.2–5.8)
RBC # FLD: 14.7 % — HIGH (ref 10.3–14.5)
SODIUM SERPL-SCNC: 141 MMOL/L — SIGNIFICANT CHANGE UP (ref 135–145)
WBC # BLD: 6.12 K/UL — SIGNIFICANT CHANGE UP (ref 3.8–10.5)
WBC # FLD AUTO: 6.12 K/UL — SIGNIFICANT CHANGE UP (ref 3.8–10.5)

## 2024-07-23 PROCEDURE — 70450 CT HEAD/BRAIN W/O DYE: CPT | Mod: 26,59

## 2024-07-23 PROCEDURE — 70496 CT ANGIOGRAPHY HEAD: CPT | Mod: 26

## 2024-07-23 PROCEDURE — 70498 CT ANGIOGRAPHY NECK: CPT | Mod: 26

## 2024-07-23 RX ADMIN — Medication 1 APPLICATION(S): at 13:43

## 2024-07-23 RX ADMIN — ENOXAPARIN SODIUM 40 MILLIGRAM(S): 100 INJECTION SUBCUTANEOUS at 16:50

## 2024-07-23 RX ADMIN — CARVEDILOL 12.5 MILLIGRAM(S): 3.12 TABLET, FILM COATED ORAL at 06:18

## 2024-07-23 RX ADMIN — LOSARTAN POTASSIUM 100 MILLIGRAM(S): 100 TABLET, FILM COATED ORAL at 21:32

## 2024-07-23 RX ADMIN — ENOXAPARIN SODIUM 40 MILLIGRAM(S): 100 INJECTION SUBCUTANEOUS at 06:18

## 2024-07-23 RX ADMIN — CARVEDILOL 12.5 MILLIGRAM(S): 3.12 TABLET, FILM COATED ORAL at 16:50

## 2024-07-23 RX ADMIN — Medication 1 APPLICATION(S): at 06:17

## 2024-07-23 RX ADMIN — Medication 1 APPLICATION(S): at 16:51

## 2024-07-23 RX ADMIN — FUROSEMIDE 40 MILLIGRAM(S): 10 INJECTION INTRAMUSCULAR; INTRAVENOUS at 13:39

## 2024-07-23 RX ADMIN — FUROSEMIDE 40 MILLIGRAM(S): 10 INJECTION INTRAMUSCULAR; INTRAVENOUS at 06:18

## 2024-07-24 LAB
ANION GAP SERPL CALC-SCNC: 12 MMOL/L — SIGNIFICANT CHANGE UP (ref 5–17)
BASE EXCESS BLDV CALC-SCNC: 11.8 MMOL/L — HIGH (ref -2–3)
BUN SERPL-MCNC: 14 MG/DL — SIGNIFICANT CHANGE UP (ref 7–23)
CA-I SERPL-SCNC: 1.15 MMOL/L — SIGNIFICANT CHANGE UP (ref 1.15–1.33)
CALCIUM SERPL-MCNC: 9.1 MG/DL — SIGNIFICANT CHANGE UP (ref 8.4–10.5)
CHLORIDE BLDV-SCNC: 95 MMOL/L — LOW (ref 96–108)
CHLORIDE SERPL-SCNC: 94 MMOL/L — LOW (ref 96–108)
CO2 BLDV-SCNC: 41 MMOL/L — HIGH (ref 22–26)
CO2 SERPL-SCNC: 33 MMOL/L — HIGH (ref 22–31)
CREAT SERPL-MCNC: 0.91 MG/DL — SIGNIFICANT CHANGE UP (ref 0.5–1.3)
EGFR: 97 ML/MIN/1.73M2 — SIGNIFICANT CHANGE UP
EGFR: 97 ML/MIN/1.73M2 — SIGNIFICANT CHANGE UP
GAS PNL BLDV: 134 MMOL/L — LOW (ref 136–145)
GAS PNL BLDV: SIGNIFICANT CHANGE UP
GAS PNL BLDV: SIGNIFICANT CHANGE UP
GLUCOSE BLDC GLUCOMTR-MCNC: 100 MG/DL — HIGH (ref 70–99)
GLUCOSE BLDC GLUCOMTR-MCNC: 105 MG/DL — HIGH (ref 70–99)
GLUCOSE BLDC GLUCOMTR-MCNC: 108 MG/DL — HIGH (ref 70–99)
GLUCOSE BLDC GLUCOMTR-MCNC: 111 MG/DL — HIGH (ref 70–99)
GLUCOSE BLDC GLUCOMTR-MCNC: 99 MG/DL — SIGNIFICANT CHANGE UP (ref 70–99)
GLUCOSE BLDV-MCNC: 119 MG/DL — HIGH (ref 70–99)
GLUCOSE SERPL-MCNC: 128 MG/DL — HIGH (ref 70–99)
HCO3 BLDV-SCNC: 39 MMOL/L — HIGH (ref 22–29)
HCT VFR BLD CALC: 39.2 % — SIGNIFICANT CHANGE UP (ref 39–50)
HCT VFR BLDA CALC: 35 % — LOW (ref 39–51)
HGB BLD CALC-MCNC: 11.7 G/DL — LOW (ref 12.6–17.4)
HGB BLD-MCNC: 12 G/DL — LOW (ref 13–17)
LACTATE BLDV-MCNC: 1.2 MMOL/L — SIGNIFICANT CHANGE UP (ref 0.5–2)
MCHC RBC-ENTMCNC: 27.1 PG — SIGNIFICANT CHANGE UP (ref 27–34)
MCHC RBC-ENTMCNC: 30.6 GM/DL — LOW (ref 32–36)
MCV RBC AUTO: 88.5 FL — SIGNIFICANT CHANGE UP (ref 80–100)
NRBC # BLD: 0 /100 WBCS — SIGNIFICANT CHANGE UP (ref 0–0)
NRBC BLD-RTO: 0 /100 WBCS — SIGNIFICANT CHANGE UP (ref 0–0)
OTHER CELLS CSF MANUAL: 13 ML/DL — LOW (ref 18–22)
PCO2 BLDV: 61 MMHG — HIGH (ref 42–55)
PH BLDV: 7.41 — SIGNIFICANT CHANGE UP (ref 7.32–7.43)
PLATELET # BLD AUTO: 304 K/UL — SIGNIFICANT CHANGE UP (ref 150–400)
PO2 BLDV: 46 MMHG — HIGH (ref 25–45)
POTASSIUM BLDV-SCNC: 3.2 MMOL/L — LOW (ref 3.5–5.1)
POTASSIUM SERPL-MCNC: 3.3 MMOL/L — LOW (ref 3.5–5.3)
POTASSIUM SERPL-SCNC: 3.3 MMOL/L — LOW (ref 3.5–5.3)
RBC # BLD: 4.43 M/UL — SIGNIFICANT CHANGE UP (ref 4.2–5.8)
RBC # FLD: 14.6 % — HIGH (ref 10.3–14.5)
SAO2 % BLDV: 81.1 % — SIGNIFICANT CHANGE UP (ref 67–88)
SODIUM SERPL-SCNC: 139 MMOL/L — SIGNIFICANT CHANGE UP (ref 135–145)
WBC # BLD: 5.49 K/UL — SIGNIFICANT CHANGE UP (ref 3.8–10.5)
WBC # FLD AUTO: 5.49 K/UL — SIGNIFICANT CHANGE UP (ref 3.8–10.5)

## 2024-07-24 RX ORDER — SPIRONOLACTONE 25 MG
25 TABLET ORAL DAILY
Refills: 0 | Status: DISCONTINUED | OUTPATIENT
Start: 2024-07-24 | End: 2024-07-27

## 2024-07-24 RX ORDER — CARVEDILOL 3.12 MG/1
6.25 TABLET, FILM COATED ORAL EVERY 12 HOURS
Refills: 0 | Status: DISCONTINUED | OUTPATIENT
Start: 2024-07-24 | End: 2024-07-27

## 2024-07-24 RX ADMIN — FUROSEMIDE 40 MILLIGRAM(S): 10 INJECTION INTRAMUSCULAR; INTRAVENOUS at 13:10

## 2024-07-24 RX ADMIN — ENOXAPARIN SODIUM 40 MILLIGRAM(S): 100 INJECTION SUBCUTANEOUS at 05:25

## 2024-07-24 RX ADMIN — CARVEDILOL 12.5 MILLIGRAM(S): 3.12 TABLET, FILM COATED ORAL at 05:25

## 2024-07-24 RX ADMIN — ENOXAPARIN SODIUM 40 MILLIGRAM(S): 100 INJECTION SUBCUTANEOUS at 17:36

## 2024-07-24 RX ADMIN — FUROSEMIDE 40 MILLIGRAM(S): 10 INJECTION INTRAMUSCULAR; INTRAVENOUS at 05:25

## 2024-07-24 RX ADMIN — LOSARTAN POTASSIUM 100 MILLIGRAM(S): 100 TABLET, FILM COATED ORAL at 21:09

## 2024-07-24 RX ADMIN — Medication 40 MILLIEQUIVALENT(S): at 11:26

## 2024-07-24 RX ADMIN — Medication 1 APPLICATION(S): at 13:15

## 2024-07-24 RX ADMIN — Medication 1 APPLICATION(S): at 18:16

## 2024-07-24 RX ADMIN — CARVEDILOL 6.25 MILLIGRAM(S): 3.12 TABLET, FILM COATED ORAL at 17:36

## 2024-07-24 RX ADMIN — Medication 1 APPLICATION(S): at 05:25

## 2024-07-24 RX ADMIN — Medication 25 MILLIGRAM(S): at 05:25

## 2024-07-25 LAB
ANION GAP SERPL CALC-SCNC: 10 MMOL/L — SIGNIFICANT CHANGE UP (ref 5–17)
BUN SERPL-MCNC: 16 MG/DL — SIGNIFICANT CHANGE UP (ref 7–23)
CALCIUM SERPL-MCNC: 9.4 MG/DL — SIGNIFICANT CHANGE UP (ref 8.4–10.5)
CHLORIDE SERPL-SCNC: 95 MMOL/L — LOW (ref 96–108)
CO2 SERPL-SCNC: 34 MMOL/L — HIGH (ref 22–31)
CREAT SERPL-MCNC: 1.03 MG/DL — SIGNIFICANT CHANGE UP (ref 0.5–1.3)
EGFR: 84 ML/MIN/1.73M2 — SIGNIFICANT CHANGE UP
EGFR: 84 ML/MIN/1.73M2 — SIGNIFICANT CHANGE UP
GLUCOSE BLDC GLUCOMTR-MCNC: 102 MG/DL — HIGH (ref 70–99)
GLUCOSE BLDC GLUCOMTR-MCNC: 113 MG/DL — HIGH (ref 70–99)
GLUCOSE BLDC GLUCOMTR-MCNC: 128 MG/DL — HIGH (ref 70–99)
GLUCOSE BLDC GLUCOMTR-MCNC: 130 MG/DL — HIGH (ref 70–99)
GLUCOSE SERPL-MCNC: 118 MG/DL — HIGH (ref 70–99)
MAGNESIUM SERPL-MCNC: 2.1 MG/DL — SIGNIFICANT CHANGE UP (ref 1.6–2.6)
POTASSIUM SERPL-MCNC: 3.5 MMOL/L — SIGNIFICANT CHANGE UP (ref 3.5–5.3)
POTASSIUM SERPL-SCNC: 3.5 MMOL/L — SIGNIFICANT CHANGE UP (ref 3.5–5.3)
SODIUM SERPL-SCNC: 139 MMOL/L — SIGNIFICANT CHANGE UP (ref 135–145)

## 2024-07-25 RX ADMIN — FUROSEMIDE 40 MILLIGRAM(S): 10 INJECTION INTRAMUSCULAR; INTRAVENOUS at 05:14

## 2024-07-25 RX ADMIN — Medication 40 MILLIEQUIVALENT(S): at 13:18

## 2024-07-25 RX ADMIN — Medication 1 APPLICATION(S): at 13:21

## 2024-07-25 RX ADMIN — ENOXAPARIN SODIUM 40 MILLIGRAM(S): 100 INJECTION SUBCUTANEOUS at 05:14

## 2024-07-25 RX ADMIN — CARVEDILOL 6.25 MILLIGRAM(S): 3.12 TABLET, FILM COATED ORAL at 17:11

## 2024-07-25 RX ADMIN — Medication 40 MILLIEQUIVALENT(S): at 17:10

## 2024-07-25 RX ADMIN — Medication 25 MILLIGRAM(S): at 05:14

## 2024-07-25 RX ADMIN — CARVEDILOL 6.25 MILLIGRAM(S): 3.12 TABLET, FILM COATED ORAL at 05:14

## 2024-07-25 RX ADMIN — Medication 1 APPLICATION(S): at 05:13

## 2024-07-25 RX ADMIN — LOSARTAN POTASSIUM 100 MILLIGRAM(S): 100 TABLET, FILM COATED ORAL at 21:11

## 2024-07-25 RX ADMIN — Medication 1 APPLICATION(S): at 17:15

## 2024-07-25 RX ADMIN — FUROSEMIDE 40 MILLIGRAM(S): 10 INJECTION INTRAMUSCULAR; INTRAVENOUS at 13:20

## 2024-07-25 RX ADMIN — ENOXAPARIN SODIUM 40 MILLIGRAM(S): 100 INJECTION SUBCUTANEOUS at 17:10

## 2024-07-26 LAB
ANION GAP SERPL CALC-SCNC: 11 MMOL/L — SIGNIFICANT CHANGE UP (ref 5–17)
BUN SERPL-MCNC: 16 MG/DL — SIGNIFICANT CHANGE UP (ref 7–23)
CALCIUM SERPL-MCNC: 9.4 MG/DL — SIGNIFICANT CHANGE UP (ref 8.4–10.5)
CHLORIDE SERPL-SCNC: 94 MMOL/L — LOW (ref 96–108)
CO2 SERPL-SCNC: 33 MMOL/L — HIGH (ref 22–31)
CREAT SERPL-MCNC: 1.03 MG/DL — SIGNIFICANT CHANGE UP (ref 0.5–1.3)
EGFR: 84 ML/MIN/1.73M2 — SIGNIFICANT CHANGE UP
EGFR: 84 ML/MIN/1.73M2 — SIGNIFICANT CHANGE UP
GLUCOSE BLDC GLUCOMTR-MCNC: 108 MG/DL — HIGH (ref 70–99)
GLUCOSE BLDC GLUCOMTR-MCNC: 113 MG/DL — HIGH (ref 70–99)
GLUCOSE BLDC GLUCOMTR-MCNC: 89 MG/DL — SIGNIFICANT CHANGE UP (ref 70–99)
GLUCOSE BLDC GLUCOMTR-MCNC: 95 MG/DL — SIGNIFICANT CHANGE UP (ref 70–99)
GLUCOSE SERPL-MCNC: 95 MG/DL — SIGNIFICANT CHANGE UP (ref 70–99)
MAGNESIUM SERPL-MCNC: 2.1 MG/DL — SIGNIFICANT CHANGE UP (ref 1.6–2.6)
PHOSPHATE SERPL-MCNC: 3.9 MG/DL — SIGNIFICANT CHANGE UP (ref 2.5–4.5)
POTASSIUM SERPL-MCNC: 3.9 MMOL/L — SIGNIFICANT CHANGE UP (ref 3.5–5.3)
POTASSIUM SERPL-SCNC: 3.9 MMOL/L — SIGNIFICANT CHANGE UP (ref 3.5–5.3)
SODIUM SERPL-SCNC: 138 MMOL/L — SIGNIFICANT CHANGE UP (ref 135–145)

## 2024-07-26 RX ORDER — BUMETANIDE 1 MG/1
1 TABLET ORAL
Refills: 0 | Status: DISCONTINUED | OUTPATIENT
Start: 2024-07-27 | End: 2024-07-27

## 2024-07-26 RX ADMIN — ENOXAPARIN SODIUM 40 MILLIGRAM(S): 100 INJECTION SUBCUTANEOUS at 17:07

## 2024-07-26 RX ADMIN — FUROSEMIDE 40 MILLIGRAM(S): 10 INJECTION INTRAMUSCULAR; INTRAVENOUS at 13:52

## 2024-07-26 RX ADMIN — Medication 25 MILLIGRAM(S): at 05:53

## 2024-07-26 RX ADMIN — ENOXAPARIN SODIUM 40 MILLIGRAM(S): 100 INJECTION SUBCUTANEOUS at 05:53

## 2024-07-26 RX ADMIN — Medication 1 APPLICATION(S): at 05:53

## 2024-07-26 RX ADMIN — CARVEDILOL 6.25 MILLIGRAM(S): 3.12 TABLET, FILM COATED ORAL at 05:54

## 2024-07-26 RX ADMIN — CARVEDILOL 6.25 MILLIGRAM(S): 3.12 TABLET, FILM COATED ORAL at 17:07

## 2024-07-26 RX ADMIN — FUROSEMIDE 40 MILLIGRAM(S): 10 INJECTION INTRAMUSCULAR; INTRAVENOUS at 05:53

## 2024-07-26 RX ADMIN — Medication 1 APPLICATION(S): at 17:09

## 2024-07-26 RX ADMIN — Medication 1 APPLICATION(S): at 11:58

## 2024-07-26 RX ADMIN — LOSARTAN POTASSIUM 100 MILLIGRAM(S): 100 TABLET, FILM COATED ORAL at 21:07

## 2024-07-27 VITALS — RESPIRATION RATE: 20 BRPM | HEART RATE: 86 BPM | OXYGEN SATURATION: 96 %

## 2024-07-27 LAB
GLUCOSE BLDC GLUCOMTR-MCNC: 113 MG/DL — HIGH (ref 70–99)
GLUCOSE BLDC GLUCOMTR-MCNC: 95 MG/DL — SIGNIFICANT CHANGE UP (ref 70–99)

## 2024-07-27 PROCEDURE — 86140 C-REACTIVE PROTEIN: CPT

## 2024-07-27 PROCEDURE — 82533 TOTAL CORTISOL: CPT

## 2024-07-27 PROCEDURE — 82803 BLOOD GASES ANY COMBINATION: CPT

## 2024-07-27 PROCEDURE — 85652 RBC SED RATE AUTOMATED: CPT

## 2024-07-27 PROCEDURE — 84132 ASSAY OF SERUM POTASSIUM: CPT

## 2024-07-27 PROCEDURE — 83880 ASSAY OF NATRIURETIC PEPTIDE: CPT

## 2024-07-27 PROCEDURE — 82962 GLUCOSE BLOOD TEST: CPT

## 2024-07-27 PROCEDURE — 70496 CT ANGIOGRAPHY HEAD: CPT | Mod: MC

## 2024-07-27 PROCEDURE — 80053 COMPREHEN METABOLIC PANEL: CPT

## 2024-07-27 PROCEDURE — 83036 HEMOGLOBIN GLYCOSYLATED A1C: CPT

## 2024-07-27 PROCEDURE — 84295 ASSAY OF SERUM SODIUM: CPT

## 2024-07-27 PROCEDURE — 36600 WITHDRAWAL OF ARTERIAL BLOOD: CPT

## 2024-07-27 PROCEDURE — 82550 ASSAY OF CK (CPK): CPT

## 2024-07-27 PROCEDURE — 83690 ASSAY OF LIPASE: CPT

## 2024-07-27 PROCEDURE — 94660 CPAP INITIATION&MGMT: CPT

## 2024-07-27 PROCEDURE — 93971 EXTREMITY STUDY: CPT

## 2024-07-27 PROCEDURE — 83735 ASSAY OF MAGNESIUM: CPT

## 2024-07-27 PROCEDURE — 36415 COLL VENOUS BLD VENIPUNCTURE: CPT

## 2024-07-27 PROCEDURE — 71045 X-RAY EXAM CHEST 1 VIEW: CPT

## 2024-07-27 PROCEDURE — 84146 ASSAY OF PROLACTIN: CPT

## 2024-07-27 PROCEDURE — 85018 HEMOGLOBIN: CPT

## 2024-07-27 PROCEDURE — 82553 CREATINE MB FRACTION: CPT

## 2024-07-27 PROCEDURE — 84480 ASSAY TRIIODOTHYRONINE (T3): CPT

## 2024-07-27 PROCEDURE — 70450 CT HEAD/BRAIN W/O DYE: CPT | Mod: MC

## 2024-07-27 PROCEDURE — C8929: CPT

## 2024-07-27 PROCEDURE — 93970 EXTREMITY STUDY: CPT

## 2024-07-27 PROCEDURE — 99285 EMERGENCY DEPT VISIT HI MDM: CPT | Mod: 25

## 2024-07-27 PROCEDURE — 85025 COMPLETE CBC W/AUTO DIFF WBC: CPT

## 2024-07-27 PROCEDURE — 84100 ASSAY OF PHOSPHORUS: CPT

## 2024-07-27 PROCEDURE — 84484 ASSAY OF TROPONIN QUANT: CPT

## 2024-07-27 PROCEDURE — 85027 COMPLETE CBC AUTOMATED: CPT

## 2024-07-27 PROCEDURE — 81003 URINALYSIS AUTO W/O SCOPE: CPT

## 2024-07-27 PROCEDURE — 80048 BASIC METABOLIC PNL TOTAL CA: CPT

## 2024-07-27 PROCEDURE — 83605 ASSAY OF LACTIC ACID: CPT

## 2024-07-27 PROCEDURE — 71260 CT THORAX DX C+: CPT | Mod: MC

## 2024-07-27 PROCEDURE — 84443 ASSAY THYROID STIM HORMONE: CPT

## 2024-07-27 PROCEDURE — 82947 ASSAY GLUCOSE BLOOD QUANT: CPT

## 2024-07-27 PROCEDURE — 93005 ELECTROCARDIOGRAM TRACING: CPT

## 2024-07-27 PROCEDURE — 70498 CT ANGIOGRAPHY NECK: CPT | Mod: MC

## 2024-07-27 PROCEDURE — 82435 ASSAY OF BLOOD CHLORIDE: CPT

## 2024-07-27 PROCEDURE — 82330 ASSAY OF CALCIUM: CPT

## 2024-07-27 PROCEDURE — 85014 HEMATOCRIT: CPT

## 2024-07-27 PROCEDURE — 97161 PT EVAL LOW COMPLEX 20 MIN: CPT

## 2024-07-27 RX ORDER — CARVEDILOL 3.12 MG/1
1 TABLET, FILM COATED ORAL
Refills: 0 | DISCHARGE

## 2024-07-27 RX ORDER — CARVEDILOL 3.12 MG/1
1 TABLET, FILM COATED ORAL
Qty: 60 | Refills: 0
Start: 2024-07-27 | End: 2024-08-25

## 2024-07-27 RX ORDER — SPIRONOLACTONE 25 MG
1 TABLET ORAL
Qty: 30 | Refills: 0
Start: 2024-07-27 | End: 2024-08-25

## 2024-07-27 RX ORDER — BUMETANIDE 1 MG/1
1 TABLET ORAL
Qty: 60 | Refills: 0
Start: 2024-07-27 | End: 2024-08-25

## 2024-07-27 RX ORDER — FUROSEMIDE 10 MG/ML
1 INJECTION INTRAMUSCULAR; INTRAVENOUS
Refills: 0 | DISCHARGE

## 2024-07-27 RX ORDER — ACETAMINOPHEN 500 MG/5ML
2 LIQUID (ML) ORAL
Qty: 0 | Refills: 0 | DISCHARGE
Start: 2024-07-27

## 2024-07-27 RX ADMIN — BUMETANIDE 1 MILLIGRAM(S): 1 TABLET ORAL at 05:35

## 2024-07-27 RX ADMIN — CARVEDILOL 6.25 MILLIGRAM(S): 3.12 TABLET, FILM COATED ORAL at 05:35

## 2024-07-27 RX ADMIN — BUMETANIDE 1 MILLIGRAM(S): 1 TABLET ORAL at 13:07

## 2024-07-27 RX ADMIN — ENOXAPARIN SODIUM 40 MILLIGRAM(S): 100 INJECTION SUBCUTANEOUS at 05:35

## 2024-07-27 RX ADMIN — Medication 1 APPLICATION(S): at 05:39

## 2024-07-27 RX ADMIN — Medication 25 MILLIGRAM(S): at 05:37
